# Patient Record
Sex: FEMALE | Race: WHITE | NOT HISPANIC OR LATINO | Employment: FULL TIME | URBAN - METROPOLITAN AREA
[De-identification: names, ages, dates, MRNs, and addresses within clinical notes are randomized per-mention and may not be internally consistent; named-entity substitution may affect disease eponyms.]

---

## 2017-07-11 ENCOUNTER — TELEPHONE (OUTPATIENT)
Dept: PEDIATRICS | Facility: CLINIC | Age: 32
End: 2017-07-11

## 2017-07-11 NOTE — LETTER
[facility] [place]  [address]  [place] [address]  [phone]      October 19, 2017    Gosia Rehman                                                                                                                                                       [address] [address]              Dear Gosia,    Our record indicates that your yearly exam with pap is past due. Your health is very important to us. Please call the phone number listed above to schedule at your convenience.    We look forward to seeing you soon.                    Sincerely,  Andrea Green MD

## 2017-07-11 NOTE — TELEPHONE ENCOUNTER
Panel Management Review      Patient has the following on her problem list: None      Composite cancer screening  Chart review shows that this patient is due/due soon for the following Pap Smear  Summary:    Patient is due/failing the following:   PAP    Action needed:   Patient needs office visit for pap and Px.    Type of outreach:    Phone, left message for patient to call back.     Questions for provider review:    None                                                                                                                                    Missy TREVINO, HAYDEE,AAMA       Chart routed to Care Team .

## 2017-07-15 ENCOUNTER — HEALTH MAINTENANCE LETTER (OUTPATIENT)
Age: 32
End: 2017-07-15

## 2017-11-06 ENCOUNTER — TELEPHONE (OUTPATIENT)
Dept: PEDIATRICS | Facility: CLINIC | Age: 32
End: 2017-11-06

## 2017-11-06 NOTE — TELEPHONE ENCOUNTER
Panel Management Review      Patient has the following on her problem list: None      Composite cancer screening  Chart review shows that this patient is due/due soon for the following Pap Smear  Summary:    Patient is due/failing the following:   PAP    Action needed:   Patient needs office visit for Px and Pap.    Type of outreach:    Phone, left message for patient to call back.     Questions for provider review:    None                                                                                                                                    Missy TREVINO, HAYDEE,AAMA       Chart routed to Care Team .

## 2017-11-06 NOTE — LETTER
[address]  [place] [address]  [phone]      January 29, 2018    Gosia Rehman                                                                                                                                                       [address] [place] [address]              Dear Gosia,    We have been unsuccessful in trying to reach you by phone. Our record indicates that your yearly physical with pap is past due. Please call the number listed above to schedule at your convenience.    Please disregard this message if you have already schedule. We look forward to seeing you soon.                        Sincerely,  Andrea Green MD  CC:Missy TREVINO CMA,KERA

## 2017-12-29 NOTE — TELEPHONE ENCOUNTER
3rd attempt. LM for Pt to call clinic to schedule.  Due for annual exam.  Missy TREVINO, HAYDEE,AAMA

## 2018-10-04 ENCOUNTER — ALLIED HEALTH/NURSE VISIT (OUTPATIENT)
Dept: NURSING | Facility: CLINIC | Age: 33
End: 2018-10-04
Payer: COMMERCIAL

## 2018-10-04 DIAGNOSIS — Z23 NEED FOR PROPHYLACTIC VACCINATION AND INOCULATION AGAINST INFLUENZA: Primary | ICD-10-CM

## 2018-10-04 PROCEDURE — 99207 ZZC NO CHARGE NURSE ONLY: CPT

## 2018-10-04 PROCEDURE — 90686 IIV4 VACC NO PRSV 0.5 ML IM: CPT

## 2018-10-04 PROCEDURE — 90471 IMMUNIZATION ADMIN: CPT

## 2018-10-04 NOTE — PROGRESS NOTES

## 2018-10-04 NOTE — MR AVS SNAPSHOT
"              After Visit Summary   10/4/2018    Gosia Rehman    MRN: [id number]           Patient Information     Date Of Birth          1985        Visit Information        Provider Department      10/4/2018 10:30 AM MIGUELANGEL NURSE AB [facility]        Today's Diagnoses     Need for prophylactic vaccination and inoculation against influenza    -  1       Follow-ups after your visit        Who to contact     If you have questions or need follow up information about today's clinic visit or your schedule please contact [facility] directly at [phone].  Normal or non-critical lab and imaging results will be communicated to you by MyChart, letter or phone within 4 business days after the clinic has received the results. If you do not hear from us within 7 days, please contact the clinic through Presentigohart or phone. If you have a critical or abnormal lab result, we will notify you by phone as soon as possible.  Submit refill requests through Pianpian or call your pharmacy and they will forward the refill request to us. Please allow 3 business days for your refill to be completed.          Additional Information About Your Visit        MyChart Information     Pianpian lets you send messages to your doctor, view your test results, renew your prescriptions, schedule appointments and more. To sign up, go to [email or web address] . Click on \"Log in\" on the left side of the screen, which will take you to the Welcome page. Then click on \"Sign up Now\" on the right side of the page.     You will be asked to enter the access code listed below, as well as some personal information. Please follow the directions to create your username and password.     Your access code is: [email or web address]  Expires: 2019 10:30 AM     Your access code will  in 90 days. If you need help or a new code, please call your [facility] or [phone].        Care EveryWhere ID     This is your Care EveryWhere ID. This " could be used by other organizations to access your [place] medical records  [id number]         Blood Pressure from Last 3 Encounters:   12/14/16 122/70   02/17/16 110/70   08/21/15 106/68    Weight from Last 3 Encounters:   12/14/16 133 lb (60.3 kg)   08/19/15 165 lb (74.8 kg)   04/30/14 131 lb 12.8 oz (59.8 kg)              We Performed the Following     FLU VACCINE, SPLIT VIRUS, IM (QUADRIVALENT) [53825]- >3 YRS     Vaccine Administration, Initial [76597]        Primary Care Provider Office Phone # Fax #    Andrea Green [phone] [address] [place] [address]        Equal Access to Services     [facility]: Hadii etelvina dennis hadasho Soomaali, waaxda luqadaha, qaybta kaalmada adeegyada, naheed arellanoin eun burrows . So [facility] [phone].    ATENCIÓN: Si habla español, tiene a lake disposición servicios gratuitos de asistencia lingüística. Llame al [phone].    We comply with applicable federal civil rights laws and [place] laws. We do not discriminate on the basis of race, color, national origin, age, disability, sex, sexual orientation, or gender identity.            Thank you!     Thank you for choosing [facility]  for your care. Our goal is always to provide you with excellent care. Hearing back from our patients is one way we can continue to improve our services. Please take a few minutes to complete the written survey that you may receive in the mail after your visit with us. Thank you!             Your Updated Medication List - Protect others around you: Learn how to safely use, store and throw away your medicines at www.disposemymeds.org.          This list is accurate as of 10/4/18 10:30 AM.  Always use your most recent med list.                   Brand Name Dispense Instructions for use Diagnosis    RECLIPSEN PO           VITAMIN D3 PO      Take 1,000 Units by mouth daily

## 2019-09-22 ENCOUNTER — OFFICE VISIT (OUTPATIENT)
Dept: URGENT CARE | Facility: URGENT CARE | Age: 34
End: 2019-09-22
Payer: COMMERCIAL

## 2019-09-22 VITALS
BODY MASS INDEX: 21.49 KG/M2 | OXYGEN SATURATION: 100 % | TEMPERATURE: 98.8 F | HEART RATE: 77 BPM | WEIGHT: 143.4 LBS | DIASTOLIC BLOOD PRESSURE: 74 MMHG | SYSTOLIC BLOOD PRESSURE: 116 MMHG

## 2019-09-22 DIAGNOSIS — R07.0 THROAT PAIN: Primary | ICD-10-CM

## 2019-09-22 LAB
DEPRECATED S PYO AG THROAT QL EIA: NORMAL
SPECIMEN SOURCE: NORMAL

## 2019-09-22 PROCEDURE — 87081 CULTURE SCREEN ONLY: CPT | Performed by: PHYSICIAN ASSISTANT

## 2019-09-22 PROCEDURE — 87880 STREP A ASSAY W/OPTIC: CPT | Performed by: PHYSICIAN ASSISTANT

## 2019-09-22 PROCEDURE — 99213 OFFICE O/P EST LOW 20 MIN: CPT | Performed by: PHYSICIAN ASSISTANT

## 2019-09-22 RX ORDER — IBUPROFEN 200 MG
200 TABLET ORAL EVERY 4 HOURS PRN
COMMUNITY

## 2019-09-22 NOTE — PROGRESS NOTES
SUBJECTIVE:   Gosia Rehman is a 33 year old female presenting with a chief complaint of cough for the past few weeks and now with ST for the past day  Family with same sx and worried about strep.  No fevers note.  Denies SOB or chest pain.  No ear pain, GI sx rashes.    Current and Associated symptoms: no fatigue or body aches  Treatment measures tried include Tylenol/Ibuprofen, Fluids and Rest.  Predisposing factors include family with same sx.    Past Medical History:   Diagnosis Date     Infertility      Current Outpatient Medications   Medication Sig Dispense Refill     ibuprofen (ADVIL/MOTRIN) 200 MG tablet Take 200 mg by mouth every 4 hours as needed for mild pain       Cholecalciferol (VITAMIN D3 PO) Take 1,000 Units by mouth daily       Desogestrel-Ethinyl Estradiol (RECLIPSEN PO)        Social History     Tobacco Use     Smoking status: Never Smoker     Smokeless tobacco: Never Used   Substance Use Topics     Alcohol use: No       ROS:  Review of systems negative except as stated above.    OBJECTIVE:  /74   Pulse 77   Temp 98.8  F (37.1  C) (Tympanic)   Wt 65 kg (143 lb 6.4 oz)   SpO2 100%   BMI 21.49 kg/m    GENERAL APPEARANCE: healthy, alert and no distress  EYES: EOMI,  PERRL, conjunctiva clear  HENT: ear canals and TM's normal.  Nose and mouth without ulcers, erythema or lesions  NECK: supple, nontender, no lymphadenopathy  RESP: lungs clear to auscultation - no rales, rhonchi or wheezes  CV: regular rates and rhythm, normal S1 S2, no murmur noted  NEURO: Normal strength and tone, sensory exam grossly normal,  normal speech and mentation  SKIN: no suspicious lesions or rashes    Results for orders placed or performed in visit on 09/22/19   Rapid strep screen   Result Value Ref Range    Specimen Description Throat     Rapid Strep A Screen       NEGATIVE: No Group A streptococcal antigen detected by immunoassay, await culture report.   Beta strep group A culture   Result Value Ref Range     Specimen Description Throat     Culture Micro No beta hemolytic Streptococcus Group A isolated        assessment/plan:  (R07.0) Throat pain  (primary encounter diagnosis)  Comment:   Plan: Rapid strep screen, Beta strep group A culture           Appears well with no signs of infection.  Culture pending.  OTC med for sx relief and to Follow-up with PCP as needed if sx worsen

## 2019-09-23 LAB
BACTERIA SPEC CULT: NORMAL
SPECIMEN SOURCE: NORMAL

## 2019-10-07 ENCOUNTER — OFFICE VISIT (OUTPATIENT)
Dept: NURSING | Facility: CLINIC | Age: 34
End: 2019-10-07
Payer: COMMERCIAL

## 2019-10-07 DIAGNOSIS — Z23 NEEDS FLU SHOT: Primary | ICD-10-CM

## 2019-10-07 PROCEDURE — 90471 IMMUNIZATION ADMIN: CPT

## 2019-10-07 PROCEDURE — 90686 IIV4 VACC NO PRSV 0.5 ML IM: CPT

## 2021-02-07 ENCOUNTER — HEALTH MAINTENANCE LETTER (OUTPATIENT)
Age: 36
End: 2021-02-07

## 2021-09-18 ENCOUNTER — HEALTH MAINTENANCE LETTER (OUTPATIENT)
Age: 36
End: 2021-09-18

## 2022-03-05 ENCOUNTER — HEALTH MAINTENANCE LETTER (OUTPATIENT)
Age: 37
End: 2022-03-05

## 2022-11-19 ENCOUNTER — HEALTH MAINTENANCE LETTER (OUTPATIENT)
Age: 37
End: 2022-11-19

## 2023-04-15 ENCOUNTER — HEALTH MAINTENANCE LETTER (OUTPATIENT)
Age: 38
End: 2023-04-15

## 2023-09-26 ENCOUNTER — OFFICE VISIT (OUTPATIENT)
Dept: INTERNAL MEDICINE | Facility: CLINIC | Age: 38
End: 2023-09-26
Payer: COMMERCIAL

## 2023-09-26 VITALS
HEIGHT: 69 IN | OXYGEN SATURATION: 97 % | HEART RATE: 77 BPM | DIASTOLIC BLOOD PRESSURE: 68 MMHG | SYSTOLIC BLOOD PRESSURE: 120 MMHG | TEMPERATURE: 97.3 F | BODY MASS INDEX: 22.87 KG/M2 | WEIGHT: 154.4 LBS | RESPIRATION RATE: 16 BRPM

## 2023-09-26 DIAGNOSIS — R21 RASH AND NONSPECIFIC SKIN ERUPTION: Primary | ICD-10-CM

## 2023-09-26 PROCEDURE — 99213 OFFICE O/P EST LOW 20 MIN: CPT | Performed by: INTERNAL MEDICINE

## 2023-09-26 RX ORDER — METHYLPREDNISOLONE 4 MG
TABLET, DOSE PACK ORAL
Qty: 21 TABLET | Refills: 0 | Status: SHIPPED | OUTPATIENT
Start: 2023-09-26

## 2023-09-26 ASSESSMENT — PAIN SCALES - GENERAL: PAINLEVEL: NO PAIN (0)

## 2023-09-26 NOTE — PROGRESS NOTES
"  Assessment & Plan     Rash and nonspecific skin eruption  No known specific triggers or predisposing risk factor.  Widespread macular rash with some raised and nonraised lesions.  No other associated symptoms like joint ache, itching or burning sensation at the rash.  Discussed with the patient on use of short term tapering dose of steroid with follow-up with dermatology team.  - Adult Dermatology Referral; Future  - methylPREDNISolone (MEDROL DOSEPAK) 4 MG tablet therapy pack; Follow Package Directions                 Samantah Stevens MD  [facility] TONIO Elise is a 37 year old, presenting for the following health issues:  Rash (Started on under arm, four weeks ago and spreading quickly all over the body, no itching or pain  )        9/26/2023    10:35 AM   Additional Questions   Roomed by Tashi       History of Present Illness       Reason for visit:  Spreading spots on ny skin  Symptom onset:  3-4 weeks ago  Symptoms include:  Pink dry spots that get larger but no itching or pain  Symptom intensity:  Moderate  Symptom progression:  Worsening  Had these symptoms before:  No  What makes it worse:  Possibly stress, suspect immune overresponse  What makes it better:  Topical steroids had small effect but did not clear area    She eats 2-3 servings of fruits and vegetables daily.She consumes 1 sweetened beverage(s) daily.She exercises with enough effort to increase her heart rate 10 to 19 minutes per day.  She exercises with enough effort to increase her heart rate 3 or less days per week.   She is taking medications regularly.                 Review of Systems   Skin:  Positive for rash.            Objective    /68   Pulse 77   Temp 97.3  F (36.3  C) (Tympanic)   Resp 16   Ht 1.74 m (5' 8.5\")   Wt 70 kg (154 lb 6.4 oz)   LMP 09/05/2023 (Approximate)   SpO2 97%   Breastfeeding No   BMI 23.13 kg/m    Body mass index is 23.13 kg/m .  Physical Exam   GENERAL: healthy, " alert and no distress  RESP: lungs clear to auscultation - no rales, rhonchi or wheezes  CV: regular rate and rhythm, normal S1 S2, no S3 or S4, no murmur, click or rub, no peripheral edema and peripheral pulses strong  SKIN: Widespread macular rash with raised and nonraised lesions, nontender.  NEURO: Normal strength and tone, mentation intact and speech normal  PSYCH: mentation appears normal, affect normal widespread macular rash

## 2023-12-18 ENCOUNTER — TELEPHONE (OUTPATIENT)
Dept: PEDIATRICS | Facility: CLINIC | Age: 38
End: 2023-12-18
Payer: COMMERCIAL

## 2023-12-18 NOTE — TELEPHONE ENCOUNTER
General Call      Reason for Call:  patient thinks she may have lost her wallet at the clinic today. She thinks at the first floor waiting room or in the lab    What are your questions or concerns:  lost in found    Date of last appointment with provider: 9-26-23    Could we send this information to you in [email or web address] or would you prefer to receive a phone call?:   Patient would prefer a phone call   Okay to leave a detailed message?: Yes at Home number on file [phone] (home)

## 2024-02-06 ENCOUNTER — TELEPHONE (OUTPATIENT)
Dept: FAMILY MEDICINE | Facility: CLINIC | Age: 39
End: 2024-02-06

## 2024-02-06 ENCOUNTER — OFFICE VISIT (OUTPATIENT)
Dept: FAMILY MEDICINE | Facility: CLINIC | Age: 39
End: 2024-02-06
Payer: COMMERCIAL

## 2024-02-06 ENCOUNTER — ANCILLARY PROCEDURE (OUTPATIENT)
Dept: GENERAL RADIOLOGY | Facility: CLINIC | Age: 39
End: 2024-02-06
Attending: PHYSICIAN ASSISTANT
Payer: COMMERCIAL

## 2024-02-06 VITALS
TEMPERATURE: 98 F | RESPIRATION RATE: 18 BRPM | HEART RATE: 89 BPM | SYSTOLIC BLOOD PRESSURE: 121 MMHG | BODY MASS INDEX: 23.55 KG/M2 | DIASTOLIC BLOOD PRESSURE: 79 MMHG | OXYGEN SATURATION: 100 % | WEIGHT: 159 LBS | HEIGHT: 69 IN

## 2024-02-06 DIAGNOSIS — M25.531 RIGHT WRIST PAIN: ICD-10-CM

## 2024-02-06 DIAGNOSIS — M25.531 RIGHT WRIST PAIN: Primary | ICD-10-CM

## 2024-02-06 PROCEDURE — 99213 OFFICE O/P EST LOW 20 MIN: CPT | Performed by: PHYSICIAN ASSISTANT

## 2024-02-06 PROCEDURE — 73110 X-RAY EXAM OF WRIST: CPT | Mod: TC | Performed by: RADIOLOGY

## 2024-02-06 ASSESSMENT — PAIN SCALES - GENERAL: PAINLEVEL: MILD PAIN (3)

## 2024-02-06 NOTE — PATIENT INSTRUCTIONS
[facility]  [address] [place] [address]  [phone]    Dr. Gonzalez, Dr. Rodriguez

## 2024-02-06 NOTE — TELEPHONE ENCOUNTER
Called and spoke with patient in regards to results.  No signs of abnormalities in area of concern.  Recommended referral to orthopedics as we spoke about today during her visit.  Comfortable with plan.

## 2024-02-06 NOTE — PROGRESS NOTES
Assessment & Plan     Right wrist pain  Obtain x-ray right wrist for further evaluation of potential cystlike structure causing impingement?  Will contact her with results of wrist x-ray and plan going forward.  Suspect given length of time without any inciting injury/trauma she should follow-up with hand/upper extremity specialist.  Provided recommendations at O.  Comfortable with plan.  - XR Wrist Right G/E 3 Views    20 minutes spent by me on the date of the encounter doing chart review, review of test results, interpretation of tests, patient visit, and documentation     Subjective   Gosia is a very pleasant 38 year old, presenting for the following health issues:  Lump    Here today for evaluation of 2-month history of right wrist pain.  Pain mostly notable when pronating.  First noted this when grabbing a bathroom towel.  Notes nodule/cyst like structure overlying her right distal ulnar region.  No numbness/tingling/weakness.  No pain at elbow, shoulder or neck.  No inciting injury/trauma.  Recently difficult to grab the steering well etc. wrist brace helpful.      History of Present Illness       Reason for visit:  Wrist pain  Symptom onset:  More than a month  Symptoms include:  Pain when twisting and a lump  Symptom intensity:  Moderate  Symptom progression:  Staying the same  Had these symptoms before:  Yes  Has tried/received treatment for these symptoms:  No  What makes it worse:  Overuse  What makes it better:  Wrist brace    She eats 2-3 servings of fruits and vegetables daily.She consumes 2 sweetened beverage(s) daily.She exercises with enough effort to increase her heart rate 10 to 19 minutes per day.  She exercises with enough effort to increase her heart rate 3 or less days per week.   She is taking medications regularly.        Review of Systems  Constitutional, neuro, ENT, endocrine, pulmonary, cardiac, gastrointestinal, genitourinary, musculoskeletal, integument and psychiatric systems are  "negative, except as otherwise noted.      Objective    /79 (BP Location: Right arm, Patient Position: Chair, Cuff Size: Adult Large)   Pulse 89   Temp 98  F (36.7  C) (Temporal)   Resp 18   Ht 1.74 m (5' 8.5\")   Wt 72.1 kg (159 lb)   LMP 01/23/2024 (Approximate)   SpO2 100%   BMI 23.82 kg/m    Body mass index is 23.82 kg/m .  Physical Exam   GENERAL: alert and no distress  EYES: Eyes grossly normal to inspection, PERRL and conjunctivae and sclerae normal  RESP: lungs clear to auscultation - no rales, rhonchi or wheezes  CV: regular rate and rhythm, normal S1 S2, no S3 or S4, no murmur, click or rub, no peripheral edema  MS: no gross musculoskeletal defects noted, no edema.  Right wrist overlying distal ulnar area with fullness, question cystlike structure.  No overlying redness, warmth or signs of infection.  Range of motion intact of right wrist however pain reproduced with pronation/supination alternating.  Normal  strength.  Neurovascularly intact.   SKIN: no suspicious lesions or rashes  NEURO: Normal strength and tone, mentation intact and speech normal  PSYCH: mentation appears normal, affect normal/bright    The likelihood of other entities in the differential is insufficient to justify any further testing for them at this time. This was explained to the patient. The patient was advised that persistent or worsening symptoms would require further evaluation. Patient advised to call the office and if unable to reach to go to the emergency room if they develop any new or worsening symptoms. Expressed understanding and agreement with above stated plan.     Signed Electronically by: Jerrod Borges PA-C    "

## 2024-06-16 ENCOUNTER — HEALTH MAINTENANCE LETTER (OUTPATIENT)
Age: 39
End: 2024-06-16

## 2025-06-21 ENCOUNTER — HEALTH MAINTENANCE LETTER (OUTPATIENT)
Age: 40
End: 2025-06-21